# Patient Record
Sex: MALE | Race: WHITE | NOT HISPANIC OR LATINO | URBAN - METROPOLITAN AREA
[De-identification: names, ages, dates, MRNs, and addresses within clinical notes are randomized per-mention and may not be internally consistent; named-entity substitution may affect disease eponyms.]

---

## 2018-06-15 ENCOUNTER — OUTPATIENT (OUTPATIENT)
Dept: OUTPATIENT SERVICES | Facility: HOSPITAL | Age: 23
LOS: 1 days | Discharge: HOME | End: 2018-06-15

## 2018-06-18 DIAGNOSIS — F11.21 OPIOID DEPENDENCE, IN REMISSION: ICD-10-CM

## 2018-08-21 DIAGNOSIS — F11.20 OPIOID DEPENDENCE, UNCOMPLICATED: ICD-10-CM

## 2019-02-28 ENCOUNTER — OUTPATIENT (OUTPATIENT)
Dept: OUTPATIENT SERVICES | Facility: HOSPITAL | Age: 24
LOS: 1 days | End: 2019-02-28
Payer: OTHER GOVERNMENT

## 2019-02-28 PROCEDURE — 73221 MRI JOINT UPR EXTREM W/O DYE: CPT | Mod: 26,LT

## 2019-04-12 ENCOUNTER — OUTPATIENT (OUTPATIENT)
Dept: OUTPATIENT SERVICES | Facility: HOSPITAL | Age: 24
LOS: 1 days | Discharge: HOME | End: 2019-04-12

## 2019-04-15 DIAGNOSIS — F11.20 OPIOID DEPENDENCE, UNCOMPLICATED: ICD-10-CM

## 2019-05-08 ENCOUNTER — EMERGENCY (EMERGENCY)
Facility: HOSPITAL | Age: 24
LOS: 1 days | Discharge: HOME | End: 2019-05-08
Attending: EMERGENCY MEDICINE | Admitting: EMERGENCY MEDICINE
Payer: COMMERCIAL

## 2019-05-08 VITALS
RESPIRATION RATE: 18 BRPM | SYSTOLIC BLOOD PRESSURE: 150 MMHG | OXYGEN SATURATION: 100 % | TEMPERATURE: 98 F | DIASTOLIC BLOOD PRESSURE: 81 MMHG | HEART RATE: 77 BPM

## 2019-05-08 DIAGNOSIS — F11.90 OPIOID USE, UNSPECIFIED, UNCOMPLICATED: ICD-10-CM

## 2019-05-08 PROCEDURE — 99283 EMERGENCY DEPT VISIT LOW MDM: CPT

## 2019-05-08 PROCEDURE — 90792 PSYCH DIAG EVAL W/MED SRVCS: CPT

## 2019-05-08 NOTE — ED BEHAVIORAL HEALTH ASSESSMENT NOTE - DESCRIPTION (FIRST USE, LAST USE, QUANTITY, FREQUENCY, DURATION)
since teen years, denies use relapsed yesterday, started as a teenager recreation pills to heroin as per mother/k2/spice

## 2019-05-08 NOTE — ED BEHAVIORAL HEALTH ASSESSMENT NOTE - HPI (INCLUDE ILLNESS QUALITY, SEVERITY, DURATION, TIMING, CONTEXT, MODIFYING FACTORS, ASSOCIATED SIGNS AND SYMPTOMS)
Patient is a 23 year old  female, domiciled with family, not in school, unemployed, with a previous diagnosis of ___ , ___ prior hospitalizations, most recently at  ___, current outpatient treatment at ___ with ___, ___ hx of self harm behaviors, ___ prior suicide attempts, ___ manic or psychotic s/s, ___ hx of violence or arrests, ___ trauma, ___ substance use/abuse, with a past medical history of ___, brought in by ___, from ____, presenting with/seeking ___, in the context of ____    During the interview, patient is calm, cooperative pleasant, smiling, he expresses remorse for relapsing and troubling his mother. he stated "I know she loves me" . he relapsed due to stress  He had a break up with his girlfriend, sleeps on the couch at home. was in [facility]. doesn't want to get in further trouble. "I feel bad putting my family through a lot". Patient is a 23 year old  female, domiciled with family, not in school, unemployed, with a previous diagnosis of opoid use disorder, nicotine use disorder, multiple rehab, currently following up at [facility] outreach program, no hx of self harm behaviors, no prior suicide attempts, denies manic or psychotic s/s, legal history-8 months for drug possession, on probation, denies trauma, with no past medical history, brought in by EMS from home for verbalizing SI    During the interview, patient is calm, cooperative pleasant, smiling, he expresses remorse for relapsing and troubling his mother. he stated "I know she loves me" .   He was fighting with mother, he stated something along the lines he wants to die because she just kept talking. he didn't mean it "at all" he relapsed due to stress. He had a break up with his girlfriend, sleeps on the couch at home. [facility]. doesn't want to get in further trouble. "I feel bad putting my family through a lot". his future goal is to "get clean" "get a job". risk of overdose and negative consequences of substance use discussed, patient stated "I have friends who have accidentally overdosed", he adamantly denies any si/hi, any intent plan.  denies symptoms of psychosis, nena, depression. "I have an drug problem that I am trying to overcome" patient offered voluntary admission and inpatient rehab. he declined, "I wants to be with my family" I have done a lot of rehabs. he wants to currently follow up outpatient. if he feels urges to use then he will check himself into rehab. Patient is a 23 year old  female, domiciled with family, not in school, unemployed, with a previous diagnosis of opoid use disorder, nicotine use disorder, multiple rehab, currently following up at [facility] outreach program, no hx of self harm behaviors, no prior suicide attempts, denies manic or psychotic s/s, legal history-8 months for drug possession, on probation, denies trauma, with no past medical history, brought in by EMS from home for verbalizing SI    During the interview, patient is calm, cooperative pleasant, , he expresses remorse for relapsing and troubling his mother. he stated "I know she loves me" .   He was fighting with mother, he stated something along the lines he wants to die because she just kept talking. he didn't mean it "at all" he relapsed due to stress. He had a break up with his girlfriend, sleeps on the couch at home. was in [facility] living. doesn't want to get in further trouble. "I feel bad putting my family through a lot". his future goal is to "get clean" "get a job". risk of overdose and negative consequences of substance use discussed, patient stated "I have friends who have accidentally overdosed", he adamantly denies any si/hi, any intent plan.  denies symptoms of psychosis, nena, depression. "I have an drug problem that I am trying to overcome" patient offered voluntary admission and inpatient rehab. he declined, "I wants to be with my family" I have done a lot of rehabs. he wants to currently follow up outpatient. if he feels urges to use then he will check himself into rehab.

## 2019-05-08 NOTE — ED PROVIDER NOTE - ENMT, MLM
Airway patent, Nasal mucosa clear. Mouth with normal mucosa. Throat has no vesicles, no oropharyngeal exudates and uvula is midline.Abrasions superficial left forehead

## 2019-05-08 NOTE — ED BEHAVIORAL HEALTH ASSESSMENT NOTE - RISK ASSESSMENT
Patient presents as a chronic high risk for harm to self/others, risk factors including difficulty expressing emotions, maladaptive responses to stress, substance use.  protective factors, including no previous suicide attempts, no history of violence, no access to firearms, no global insomnia, , supportive family and social supports, willingness to seek help, no suicidal/homicidal ideations intent or plans, hopefulness for future, engaging in discharge and safety planning, motivation to participate in outpatient treatment. risks can be further mitigated by medication to help with heroin addiction. Patient presents as a chronic high risk for harm to self/others, risk factors including difficulty expressing emotions, maladaptive responses to stress, substance use/heroin use. patient is not an imminent risk to self or others at this time, his protective factors, including no previous suicide attempts, no history of violence, no access to firearms, no global insomnia, , supportive family and social supports, willingness to seek help, no suicidal/homicidal ideations intent or plans, hopefulness for future, engaging in discharge and safety planning, motivation to participate in outpatient treatment. risks can be further mitigated by medication to help with heroin addiction.

## 2019-05-08 NOTE — ED BEHAVIORAL HEALTH ASSESSMENT NOTE - SUMMARY
Patient is a 23 year old  female, domiciled with family, not in school, unemployed, with a previous diagnosis of opoid use disorder, nicotine use disorder, multiple rehab, [facility], no hx of self harm behaviors, no prior suicide attempts, denies manic or psychotic s/s, legal history-8 months for drug possession, on probation, denies trauma, with no past medical history, brought in by EMS from home for verbalizing SI    patient is calm, cooperative, pleasant, adamantly denies any si/hi, any intent plan, as per mother, no past SA, never verbalized plan, she is concerned about his substance use. patient expressed remorse about his relapse and behavior with mother. patient is not an imminent risk to self or others at this time.

## 2019-05-08 NOTE — ED BEHAVIORAL HEALTH ASSESSMENT NOTE - SUICIDE RISK FACTORS
Highly impulsive behavior/Substance abuse/dependence/Agitation/severe anxiety/Perceived burden on family and others/Access to means (pills, firearms, etc.)

## 2019-05-08 NOTE — ED PROVIDER NOTE - OBJECTIVE STATEMENT
23 yr old male with SI as per mother, patient with long standing drug abuse, snorts heroin, not daily, snorted a lot yesterday, scratched face, noted bruise left leg that he is unsure how he got.

## 2019-05-08 NOTE — ED BEHAVIORAL HEALTH ASSESSMENT NOTE - DESCRIPTION
calm cooperative  Vital Signs Last 24 Hrs  T(C): 36.8 (08 May 2019 10:57), Max: 36.8 (08 May 2019 10:57)  T(F): 98.2 (08 May 2019 10:57), Max: 98.2 (08 May 2019 10:57)  HR: 77 (08 May 2019 10:57) (77 - 77)  BP: 150/81 (08 May 2019 10:57) (150/81 - 150/81)  BP(mean): --  RR: 18 (08 May 2019 10:57) (18 - 18)  SpO2: 100% (08 May 2019 10:57) (100% - 100%) denies lives with family, unemployed,

## 2019-05-08 NOTE — ED BEHAVIORAL HEALTH ASSESSMENT NOTE - SAFETY PLAN DETAILS
patient advised to return to ED or call 911 for any worsening symptoms and patient agreed. risk of substsane use and accidental overdose discussed. if patient feels urges, advised to check into inpatient rehab. patient verbalized understanding

## 2019-05-08 NOTE — ED ADULT NURSE NOTE - OBJECTIVE STATEMENT
Pt received in .  AOX4, skin warm and dry.  Noted to have scratches to face and neck.  Pt endorses scratches 2/2 heroine use. Pt received in .  AOX4, skin warm and dry.  Noted to have scratches to face and neck.  Pt endorses scratches 2/2 heroine use x 3-4 days.  Pt endorses recently being released from alf for "drugs".   Pt endorses that he did tell his mother that he was going to kill himself.  Denies any si/hi.

## 2019-05-08 NOTE — ED PROVIDER NOTE - NSFOLLOWUPINSTRUCTIONS_ED_ALL_ED_FT
You will be called  with an appointment  with [facility]. PLease avoid heroin use.

## 2019-05-08 NOTE — ED BEHAVIORAL HEALTH NOTE - BEHAVIORAL HEALTH NOTE
Worker spoke with patient’s mother Angeline Colorado ([phone]) for collateral information. All information is as per Mrs. Colorado:    Patient is a 23 year old male, unemployed, domiciled with parents, recently released from alf for an 8 month sentence, with a long history of substance abuse, BIBEMS activated by patient’s mother. Mrs. Colorado states that the patient was in snf for 8 months and was recently released from alf a month. Mrs. Colorado states that the patient started to use heroin a week after being released. She states that the patient used K2/ spice yesterday. She states that the patient goes to [facility] outpatient and last went May 1 for group therapy. She states that the patient overdose on substances a year and a half ago. She states that she told the patient last night that he was high last night and the patient told her that he did not. She states that the patient was frustrated and yelled he wanted to die but did not state plan or intent. She states that the patient has been bringing clean urine to [facility] and to his  and using hand warmers to test negative on urine screens. She denies that the patient has been violent or physically aggressive towards her and states that the patient was verbally aggressive by only by screaming.  Mrs. Colorado states that the patient has no history of SA/SIB. She states the patient has been living with her all his life and has never been psychiatrically admitted or has been on any medication. Mrs. Colorado states that the patient has been in and out of rehab for his extensive drug use. She states the patient is in the process of going to felony drug diversion court and has to be clean from drugs for 18 months.  She denies that the patient has engaged in any violent or aggressive behaviors today. Patient will be discharged as per MD. Worker explained to Mrs. Colorado that the patient is cleared psychiatrically. Worker provided information about obtaining a restraining order against the patient and encouraged mother to take out a restraining order against patient if he escalates. Worker also met with patient and encouraged him to follow up with [facility]. Worker called Skylar Greco ([phone]) manager at [facility] who states that they will outreach to patient for a follow up appointment. Worker informed patient that provider from [facility] will be contacting him with appointment.

## 2019-05-08 NOTE — ED ADULT NURSE NOTE - CHIEF COMPLAINT QUOTE
Pt expressed SI this morning to his mother . Pt. arrives with police escort. Pt admites to being a drug user. Pt last used heroine yesterday. Pt with bruising to left knee. Pt expressed SI this morning to his mother . Pt. arrives with police escort. Pt admits to being a drug user. Pt last used heroine yesterday. Pt with bruising to left knee.

## 2019-05-08 NOTE — ED ADULT TRIAGE NOTE - CHIEF COMPLAINT QUOTE
Pt expressed SI this morning to his mother . Pt. arrives with police escort. Pt admites to being a drug user. Pt last used heroine yesterday. Pt expressed SI this morning to his mother . Pt. arrives with police escort. Pt admites to being a drug user. Pt last used heroine yesterday. Pt with bruising to left knee.

## 2019-05-08 NOTE — ED BEHAVIORAL HEALTH ASSESSMENT NOTE - OTHER PAST PSYCHIATRIC HISTORY (INCLUDE DETAILS REGARDING ONSET, COURSE OF ILLNESS, INPATIENT/OUTPATIENT TREATMENT)
no past psychiatric admission, no past SA  multiple rehabs: as per [facility], [facility] (6/2018-7/2018, [facility] inpatient rehab 2018 for 5 months, [facility] inpatient rehab 12/2017 -2018, [facility] 2017, [facility] outpatient 2017, [facility] 2017. [facility] inpatient rehab & outpt 2017  [facility] up at [facility], last attended group therapy 5/1/19

## 2022-01-06 ENCOUNTER — OUTPATIENT (OUTPATIENT)
Dept: OUTPATIENT SERVICES | Facility: HOSPITAL | Age: 27
LOS: 1 days | Discharge: HOME | End: 2022-01-06

## 2022-01-06 PROBLEM — Z78.9 OTHER SPECIFIED HEALTH STATUS: Chronic | Status: ACTIVE | Noted: 2019-05-08

## 2022-02-04 DIAGNOSIS — F11.21 OPIOID DEPENDENCE, IN REMISSION: ICD-10-CM

## 2022-05-06 ENCOUNTER — EMERGENCY (EMERGENCY)
Facility: HOSPITAL | Age: 27
LOS: 1 days | Discharge: HOME | End: 2022-05-06
Admitting: EMERGENCY MEDICINE
Payer: OTHER GOVERNMENT

## 2022-05-06 PROCEDURE — 99284 EMERGENCY DEPT VISIT MOD MDM: CPT
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
## 2022-05-09 DIAGNOSIS — S09.90XA UNSPECIFIED INJURY OF HEAD, INITIAL ENCOUNTER: ICD-10-CM

## 2022-05-09 DIAGNOSIS — S09.8XXA OTHER SPECIFIED INJURIES OF HEAD, INITIAL ENCOUNTER: ICD-10-CM
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
## 2024-05-30 ENCOUNTER — OUTPATIENT (OUTPATIENT)
Dept: OUTPATIENT SERVICES | Facility: HOSPITAL | Age: 29
LOS: 1 days | Discharge: HOME | End: 2024-05-30

## 2024-05-31 DIAGNOSIS — F11.20 OPIOID DEPENDENCE, UNCOMPLICATED: ICD-10-CM

## 2024-05-31 DIAGNOSIS — F12.20 CANNABIS DEPENDENCE, UNCOMPLICATED: ICD-10-CM

## 2024-06-30 PROBLEM — Z00.00 ENCOUNTER FOR PREVENTIVE HEALTH EXAMINATION: Status: ACTIVE | Noted: 2024-06-30

## 2024-06-30 PROBLEM — M54.9 ACUTE BACK PAIN, UNSPECIFIED BACK LOCATION, UNSPECIFIED BACK PAIN LATERALITY: Status: ACTIVE | Noted: 2024-06-30

## 2024-07-01 ENCOUNTER — APPOINTMENT (OUTPATIENT)
Dept: ORTHOPEDIC SURGERY | Facility: CLINIC | Age: 29
End: 2024-07-01

## 2024-07-03 ENCOUNTER — APPOINTMENT (OUTPATIENT)
Dept: ORTHOPEDIC SURGERY | Facility: CLINIC | Age: 29
End: 2024-07-03
Payer: MEDICAID

## 2024-07-03 VITALS — BODY MASS INDEX: 26.6 KG/M2 | WEIGHT: 190 LBS | HEIGHT: 71 IN

## 2024-07-03 DIAGNOSIS — M54.16 RADICULOPATHY, LUMBAR REGION: ICD-10-CM

## 2024-07-03 PROCEDURE — 99203 OFFICE O/P NEW LOW 30 MIN: CPT

## 2024-07-03 RX ORDER — METHYLPREDNISOLONE 4 MG/1
4 TABLET ORAL
Qty: 1 | Refills: 0 | Status: ACTIVE | COMMUNITY
Start: 2024-07-03 | End: 1900-01-01

## 2024-12-03 ENCOUNTER — APPOINTMENT (OUTPATIENT)
Age: 29
End: 2024-12-03

## 2025-08-20 ENCOUNTER — APPOINTMENT (OUTPATIENT)
Dept: COLORECTAL SURGERY | Facility: CLINIC | Age: 30
End: 2025-08-20